# Patient Record
Sex: MALE | Race: BLACK OR AFRICAN AMERICAN | NOT HISPANIC OR LATINO | URBAN - METROPOLITAN AREA
[De-identification: names, ages, dates, MRNs, and addresses within clinical notes are randomized per-mention and may not be internally consistent; named-entity substitution may affect disease eponyms.]

---

## 2022-10-11 ENCOUNTER — EMERGENCY (EMERGENCY)
Facility: HOSPITAL | Age: 27
LOS: 1 days | Discharge: HOME | End: 2022-10-11
Attending: EMERGENCY MEDICINE | Admitting: EMERGENCY MEDICINE

## 2022-10-11 VITALS
OXYGEN SATURATION: 96 % | WEIGHT: 169.98 LBS | DIASTOLIC BLOOD PRESSURE: 80 MMHG | TEMPERATURE: 98 F | RESPIRATION RATE: 20 BRPM | HEIGHT: 68 IN | HEART RATE: 113 BPM | SYSTOLIC BLOOD PRESSURE: 128 MMHG

## 2022-10-11 VITALS
OXYGEN SATURATION: 98 % | DIASTOLIC BLOOD PRESSURE: 72 MMHG | TEMPERATURE: 98 F | RESPIRATION RATE: 18 BRPM | SYSTOLIC BLOOD PRESSURE: 119 MMHG | HEART RATE: 75 BPM

## 2022-10-11 DIAGNOSIS — S43.004A UNSPECIFIED DISLOCATION OF RIGHT SHOULDER JOINT, INITIAL ENCOUNTER: ICD-10-CM
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
## 2022-10-11 PROCEDURE — 23655 CLTX SHO DSLC W/MNPJ W/ANES: CPT | Mod: 54,RT

## 2022-10-11 PROCEDURE — 73030 X-RAY EXAM OF SHOULDER: CPT | Mod: 26,RT

## 2022-10-11 PROCEDURE — 99284 EMERGENCY DEPT VISIT MOD MDM: CPT | Mod: 25,57

## 2022-10-11 PROCEDURE — 73030 X-RAY EXAM OF SHOULDER: CPT | Mod: 26,RT,76

## 2022-10-11 PROCEDURE — 99152 MOD SED SAME PHYS/QHP 5/>YRS: CPT

## 2022-10-11 RX ORDER — PROPOFOL 10 MG/ML
6 INJECTION, EMULSION INTRAVENOUS ONCE
Refills: 0 | Status: COMPLETED | OUTPATIENT
Start: 2022-10-11 | End: 2022-10-11

## 2022-10-11 RX ORDER — MORPHINE SULFATE 50 MG/1
6 CAPSULE, EXTENDED RELEASE ORAL ONCE
Refills: 0 | Status: DISCONTINUED | OUTPATIENT
Start: 2022-10-11 | End: 2022-10-11

## 2022-10-11 RX ORDER — PROPOFOL 10 MG/ML
3 INJECTION, EMULSION INTRAVENOUS ONCE
Refills: 0 | Status: COMPLETED | OUTPATIENT
Start: 2022-10-11 | End: 2022-10-11

## 2022-10-11 RX ORDER — PROPOFOL 10 MG/ML
10 INJECTION, EMULSION INTRAVENOUS ONCE
Refills: 0 | Status: COMPLETED | OUTPATIENT
Start: 2022-10-11 | End: 2022-10-11

## 2022-10-11 RX ADMIN — PROPOFOL 6 MILLIGRAM(S): 10 INJECTION, EMULSION INTRAVENOUS at 20:43

## 2022-10-11 RX ADMIN — MORPHINE SULFATE 6 MILLIGRAM(S): 50 CAPSULE, EXTENDED RELEASE ORAL at 22:14

## 2022-10-11 RX ADMIN — PROPOFOL 3 MILLIGRAM(S): 10 INJECTION, EMULSION INTRAVENOUS at 20:37

## 2022-10-11 RX ADMIN — MORPHINE SULFATE 6 MILLIGRAM(S): 50 CAPSULE, EXTENDED RELEASE ORAL at 19:08

## 2022-10-11 RX ADMIN — PROPOFOL 10 MILLIGRAM(S): 10 INJECTION, EMULSION INTRAVENOUS at 20:48

## 2022-10-11 RX ADMIN — PROPOFOL 6 MILLIGRAM(S): 10 INJECTION, EMULSION INTRAVENOUS at 20:34

## 2022-10-11 RX ADMIN — PROPOFOL 6 MILLIGRAM(S): 10 INJECTION, EMULSION INTRAVENOUS at 20:39

## 2022-10-11 NOTE — ED PROVIDER NOTE - PROGRESS NOTE DETAILS
Patient is resting comfortably, NAD. Awake, alert, oriented x3, gait steady, speech clear. Signed out to Dr. Perez awaiting repeat XR. If shoulder reduced, will d/c. Patient is resting comfortably, NAD. Awake, alert, oriented x3, gait steady, speech clear. Shoulder reduced. f/u with ortho in 1 week. instructed to wear shoulder immobilizer at all times until cleared by ortho.

## 2022-10-11 NOTE — ED ADULT NURSE REASSESSMENT NOTE - NS ED NURSE REASSESS COMMENT FT1
Received pt from previous RN. Pt placed in room for conscious sedation. Placed on ccm, pulse ox, and ETCO2 monitoring. VSS. Pt verbalizes understanding of place and need for R shoulder reduction. Pt is AOx4.

## 2022-10-11 NOTE — ED ADULT TRIAGE NOTE - NS ED TRIAGE AVPU SCALE
Alert-The patient is alert, awake and responds to voice. The patient is oriented to time, place, and person. The triage nurse is able to obtain subjective information.
R LE/weight-bearing as tolerated

## 2022-10-11 NOTE — ED PROVIDER NOTE - NSFOLLOWUPINSTRUCTIONS_ED_ALL_ED_FT
Closed Reduction    WHAT YOU NEED TO KNOW:    Closed reduction is a procedure to put the pieces of a broken bone back into the right place without surgery. Closed reduction is used when your bone is broken in one place and the bone pieces have not gone through the skin. It is also used when you do not need hardware such as pins, screws, or plates to hold the pieces of bone in place. It is best if closed reduction can be done as soon as possible after your bone is broken.    DISCHARGE INSTRUCTIONS:    Call your local emergency number (911 in the ) if:   •You feel lightheaded, short of breath, and have chest pain.      •You cough up blood.      Call your doctor if:   •Your arm or leg feels warm, tender, and painful. It may look swollen and red.      •You have severe pain, even after you take medicine.      •Your injured limb is red or swollen.      •You have numbness in your fingers or toes.      •You have a fever or chills.      •There is a foul smell coming from inside your splint or cast.      •Your splint or cast gets damaged, wet, or cracks.      •Your splint or cast feels too tight or too loose.      •You have questions or concerns about your condition or care.      Medicines: You may need any of the following:   •Acetaminophen decreases pain and fever. It is available without a doctor's order. Ask how much to take and how often to take it. Follow directions. Read the labels of all other medicines you are using to see if they also contain acetaminophen, or ask your doctor or pharmacist. Acetaminophen can cause liver damage if not taken correctly.      •NSAIDs, such as ibuprofen, help decrease swelling, pain, and fever. This medicine is available with or without a doctor's order. NSAIDs can cause stomach bleeding or kidney problems in certain people. If you take blood thinner medicine, always ask if NSAIDs are safe for you. Always read the medicine label and follow directions. Do not give these medicines to children younger than 6 months without direction from a healthcare provider.      •Prescription pain medicine may be given. Ask your healthcare provider how to take this medicine safely. Some prescription pain medicines contain acetaminophen. Do not take other medicines that contain acetaminophen without talking to your healthcare provider. Too much acetaminophen may cause liver damage. Prescription pain medicine may cause constipation. Ask your healthcare provider how to prevent or treat constipation.       •Take your medicine as directed. Contact your healthcare provider if you think your medicine is not helping or if you have side effects. Tell your provider if you are allergic to any medicine. Keep a list of the medicines, vitamins, and herbs you take. Include the amounts, and when and why you take them. Bring the list or the pill bottles to follow-up visits. Carry your medicine list with you in case of an emergency.      Care for yourself at home:   •Use crutches or a walker as directed if you broke a bone in your leg or foot. These devices will help you walk and take some weight off your injured leg or foot.      •Rest your limb as much as possible. Ask your healthcare provider when you can return to daily activities. You will need to limit activities or exercise while your bone heals.      •Elevate your injured area above the level of your heart as often as you can. This will help decrease swelling and pain. Prop your cast or splint on pillows or blankets to keep it elevated comfortably.       •Apply ice on your broken bone for 15 to 20 minutes every hour or as directed. Use an ice pack, or put crushed ice in a plastic bag. Cover it with a towel. Ice helps prevent tissue damage and decreases swelling and pain.      •Do not take baths, soak in a hot tub, or go swimming until your provider says it is okay.      •Go to physical therapy if directed. Physical therapy usually starts after your bones have healed and your splint or cast is removed. A physical therapist teaches you exercises to help improve movement and strength.      •Do not wear rings on your fingers if the break was in an arm or hand. Don't wear rings on your toes if the break was in the leg or foot.      Cast and splint care:   •Check your cast every day. Call your healthcare provider if you notice any cracks, dents, holes, or flaking on your cast.       •Keep your splint or cast clean and dry. Cover your splint or cast with a towel when you eat. If your splint or cast gets dirty, use a mild detergent and a damp washcloth to wipe off the outside. Continue to cover your splint or cast with trash bags to keep it dry while you bathe.       •Care for the edges of your cast. Cover the cast edges to keep them smooth. Use 4 inch pieces of waterproof tape. Place one end of the tape under the inside edge of your cast and fold it over to the outside surface. Overlap tape strips until the edges are completely covered. Change the tape as directed. Do not pull or repair any of the padding from inside the cast. This could cause blisters and sores on the skin under your cast.       •Keep weight off your splint or cast. Do not let anyone push down or lean on your splint or cast. This may cause it to break.      •Do not put sharp objects in the splint or cast. Do not use a sharp or pointed object to scratch under your splint or cast. This may cause wounds that can get infected, or you may lose the item inside. If your skin itches, blow cool air into the splint or cast. You may also gently scratch your skin outside with a cloth.       Follow up with your healthcare provider as directed: You may need to return for x-rays or to have your splint changed to a cast. Write down your questions so you remember to ask them during your visits.        © Copyright [employer] 2022           back to top                          © Copyright [employer] 2022

## 2022-10-11 NOTE — ED PROVIDER NOTE - PATIENT PORTAL LINK FT
You can access the FollowMyHealth Patient Portal offered by  by registering at the following website: [email or web address]. By joining [employer]’s FollowMyHealth portal, you will also be able to view your health information using other applications (apps) compatible with our system.

## 2022-10-11 NOTE — ED ADULT TRIAGE NOTE - CHIEF COMPLAINT QUOTE
Pt BIBA with c/o Rt shoulder pain while playing basketball + deformity. Prior hx shoulder dislocation.

## 2022-10-11 NOTE — ED ADULT NURSE NOTE - OBJECTIVE STATEMENT
Patient reports right shoulder dislocation during a game of basketball. Patient reports previous shoulder dislocation. Patient reports pain and numbness/ tingling to RUE. Patient has FROM of elbow, wrist and digits.

## 2022-10-11 NOTE — ED PROVIDER NOTE - PHYSICAL EXAMINATION
Gen: Well-developed, well-nourished, NAD, VS as noted by nursing. HEENT: NCAT, mmm   Chest: RRR, nl S1 and S2, no m/r/g. Resp: CTAB, no w/r/r  Abd: nl BS, soft, nt/nd. Ext: Warm, dry. right shoulder deformity, decreased ROM. hypesthesia over right deltoid  Neuro: CN II-XII intact, normal and equal strength, sensation, and reflexes bilaterally, normal gait  Psych: AAOx3

## 2022-10-11 NOTE — ED PROVIDER NOTE - OBJECTIVE STATEMENT
Patient was playing basketball, hit ball with right hand, felt "pop" in right shoulder. Has dislocated that shoulder 5 times. Never followed up with orthopedics. No focal weakness, paresthesias.

## 2022-10-12 NOTE — ED PROCEDURE NOTE - NS_POSTPROCCAREGUIDE_ED_ALL_ED
Patient is now fully awake, with vital signs and temperature stable, hydration is adequate, patients Danna’s  score is at baseline (or greater than 8), patient and escort has received  discharge education.

## 2023-04-04 ENCOUNTER — EMERGENCY (EMERGENCY)
Facility: HOSPITAL | Age: 28
LOS: 1 days | Discharge: ANOTHER HOSPITAL | End: 2023-04-04
Attending: EMERGENCY MEDICINE | Admitting: EMERGENCY MEDICINE
Payer: MEDICAID

## 2023-04-04 VITALS
DIASTOLIC BLOOD PRESSURE: 83 MMHG | HEART RATE: 81 BPM | RESPIRATION RATE: 978 BRPM | SYSTOLIC BLOOD PRESSURE: 128 MMHG | OXYGEN SATURATION: 98 % | WEIGHT: 198.42 LBS | TEMPERATURE: 98 F
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
## 2023-04-04 DIAGNOSIS — M24.411 RECURRENT DISLOCATION, RIGHT SHOULDER: ICD-10-CM

## 2023-04-04 DIAGNOSIS — Z20.822 CONTACT WITH AND (SUSPECTED) EXPOSURE TO COVID-19: ICD-10-CM

## 2023-04-04 DIAGNOSIS — M25.511 PAIN IN RIGHT SHOULDER: ICD-10-CM
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
## 2023-04-04 LAB
ALBUMIN SERPL ELPH-MCNC: 4.5 G/DL — SIGNIFICANT CHANGE UP (ref 3.4–5)
ALP SERPL-CCNC: 71 U/L — SIGNIFICANT CHANGE UP (ref 40–120)
ALT FLD-CCNC: 27 U/L — SIGNIFICANT CHANGE UP (ref 12–42)
ANION GAP SERPL CALC-SCNC: 5 MMOL/L — LOW (ref 9–16)
APTT BLD: 28.3 SEC — SIGNIFICANT CHANGE UP (ref 27.5–35.5)
AST SERPL-CCNC: 21 U/L — SIGNIFICANT CHANGE UP (ref 15–37)
BILIRUB SERPL-MCNC: 0.2 MG/DL — SIGNIFICANT CHANGE UP (ref 0.2–1.2)
BUN SERPL-MCNC: 17 MG/DL — SIGNIFICANT CHANGE UP (ref 7–23)
CALCIUM SERPL-MCNC: 8.9 MG/DL — SIGNIFICANT CHANGE UP (ref 8.5–10.5)
CHLORIDE SERPL-SCNC: 107 MMOL/L — SIGNIFICANT CHANGE UP (ref 96–108)
CO2 SERPL-SCNC: 30 MMOL/L — SIGNIFICANT CHANGE UP (ref 22–31)
CREAT SERPL-MCNC: 1.7 MG/DL — HIGH (ref 0.5–1.3)
EGFR: 56 ML/MIN/1.73M2 — LOW
GLUCOSE SERPL-MCNC: 92 MG/DL — SIGNIFICANT CHANGE UP (ref 70–99)
HCT VFR BLD CALC: 44.1 % — SIGNIFICANT CHANGE UP (ref 39–50)
HGB BLD-MCNC: 14.6 G/DL — SIGNIFICANT CHANGE UP (ref 13–17)
HIV 1 & 2 AB SERPL IA.RAPID: SIGNIFICANT CHANGE UP
INR BLD: 1.11 — SIGNIFICANT CHANGE UP (ref 0.88–1.16)
MCHC RBC-ENTMCNC: 28.3 PG — SIGNIFICANT CHANGE UP (ref 27–34)
MCHC RBC-ENTMCNC: 33.1 GM/DL — SIGNIFICANT CHANGE UP (ref 32–36)
MCV RBC AUTO: 85.6 FL — SIGNIFICANT CHANGE UP (ref 80–100)
NRBC # BLD: 0 /100 WBCS — SIGNIFICANT CHANGE UP (ref 0–0)
PLATELET # BLD AUTO: 207 K/UL — SIGNIFICANT CHANGE UP (ref 150–400)
POTASSIUM SERPL-MCNC: 4 MMOL/L — SIGNIFICANT CHANGE UP (ref 3.5–5.3)
POTASSIUM SERPL-SCNC: 4 MMOL/L — SIGNIFICANT CHANGE UP (ref 3.5–5.3)
PROT SERPL-MCNC: 8 G/DL — SIGNIFICANT CHANGE UP (ref 6.4–8.2)
PROTHROM AB SERPL-ACNC: 13 SEC — SIGNIFICANT CHANGE UP (ref 10.5–13.4)
RBC # BLD: 5.15 M/UL — SIGNIFICANT CHANGE UP (ref 4.2–5.8)
RBC # FLD: 12.4 % — SIGNIFICANT CHANGE UP (ref 10.3–14.5)
SARS-COV-2 RNA SPEC QL NAA+PROBE: SIGNIFICANT CHANGE UP
SODIUM SERPL-SCNC: 142 MMOL/L — SIGNIFICANT CHANGE UP (ref 132–145)
WBC # BLD: 6.71 K/UL — SIGNIFICANT CHANGE UP (ref 3.8–10.5)
WBC # FLD AUTO: 6.71 K/UL — SIGNIFICANT CHANGE UP (ref 3.8–10.5)

## 2023-04-04 PROCEDURE — 99285 EMERGENCY DEPT VISIT HI MDM: CPT

## 2023-04-04 PROCEDURE — 73030 X-RAY EXAM OF SHOULDER: CPT | Mod: 26,RT

## 2023-04-04 RX ORDER — KETOROLAC TROMETHAMINE 30 MG/ML
15 SYRINGE (ML) INJECTION ONCE
Refills: 0 | Status: DISCONTINUED | OUTPATIENT
Start: 2023-04-04 | End: 2023-04-04

## 2023-04-04 RX ORDER — MORPHINE SULFATE 50 MG/1
2 CAPSULE, EXTENDED RELEASE ORAL ONCE
Refills: 0 | Status: DISCONTINUED | OUTPATIENT
Start: 2023-04-04 | End: 2023-04-04

## 2023-04-04 RX ORDER — MIDAZOLAM HYDROCHLORIDE 1 MG/ML
4 INJECTION, SOLUTION INTRAMUSCULAR; INTRAVENOUS ONCE
Refills: 0 | Status: DISCONTINUED | OUTPATIENT
Start: 2023-04-04 | End: 2023-04-04

## 2023-04-04 RX ORDER — FENTANYL CITRATE 50 UG/ML
75 INJECTION INTRAVENOUS ONCE
Refills: 0 | Status: DISCONTINUED | OUTPATIENT
Start: 2023-04-04 | End: 2023-04-04

## 2023-04-04 RX ORDER — OXYCODONE AND ACETAMINOPHEN 5; 325 MG/1; MG/1
1 TABLET ORAL ONCE
Refills: 0 | Status: DISCONTINUED | OUTPATIENT
Start: 2023-04-04 | End: 2023-04-04

## 2023-04-04 RX ORDER — SODIUM CHLORIDE 9 MG/ML
1000 INJECTION INTRAMUSCULAR; INTRAVENOUS; SUBCUTANEOUS ONCE
Refills: 0 | Status: COMPLETED | OUTPATIENT
Start: 2023-04-04 | End: 2023-04-04

## 2023-04-04 RX ORDER — KETOROLAC TROMETHAMINE 30 MG/ML
30 SYRINGE (ML) INJECTION ONCE
Refills: 0 | Status: DISCONTINUED | OUTPATIENT
Start: 2023-04-04 | End: 2023-04-04

## 2023-04-04 RX ADMIN — MORPHINE SULFATE 2 MILLIGRAM(S): 50 CAPSULE, EXTENDED RELEASE ORAL at 22:21

## 2023-04-04 RX ADMIN — Medication 15 MILLIGRAM(S): at 19:25

## 2023-04-04 RX ADMIN — MORPHINE SULFATE 2 MILLIGRAM(S): 50 CAPSULE, EXTENDED RELEASE ORAL at 19:25

## 2023-04-04 RX ADMIN — MIDAZOLAM HYDROCHLORIDE 4 MILLIGRAM(S): 1 INJECTION, SOLUTION INTRAMUSCULAR; INTRAVENOUS at 20:23

## 2023-04-04 RX ADMIN — MORPHINE SULFATE 2 MILLIGRAM(S): 50 CAPSULE, EXTENDED RELEASE ORAL at 19:53

## 2023-04-04 RX ADMIN — SODIUM CHLORIDE 1000 MILLILITER(S): 9 INJECTION INTRAMUSCULAR; INTRAVENOUS; SUBCUTANEOUS at 20:23

## 2023-04-04 RX ADMIN — Medication 30 MILLIGRAM(S): at 18:55

## 2023-04-04 RX ADMIN — FENTANYL CITRATE 75 MICROGRAM(S): 50 INJECTION INTRAVENOUS at 20:23

## 2023-04-04 NOTE — ED ADULT NURSE NOTE - NSIMPLEMENTINTERV_GEN_ALL_ED
Implemented All Universal Safety Interventions:  [place] to call system. Call bell, personal items and telephone within reach. Instruct patient to call for assistance. Room bathroom lighting operational. Non-slip footwear when patient is off stretcher. Physically safe environment: no spills, clutter or unnecessary equipment. Stretcher in lowest position, wheels locked, appropriate side rails in place.

## 2023-04-04 NOTE — ED PROVIDER NOTE - CLINICAL SUMMARY MEDICAL DECISION MAKING FREE TEXT BOX
Shoulder pain right, was playing basketball at [employer] and states he raised his arms (no fall or direct blow) and felt pain.  States he has dislocated this shoulder approximately 10 times in the past few years but has not followed up with ortho.      Attempted reduction under sedation.  Reduction unsuccessful.  Called ortho through [facility], Dr Katharine hunt, recommends ED transfer, Dr Morales hunt.

## 2023-04-04 NOTE — ED PROVIDER NOTE - OBJECTIVE STATEMENT
Shoulder pain right, was playing basketball at [employer] and states he raised his arms (no fall or direct blow) and felt pain.  States he has dislocated this shoulder approximately 10 times in the past few years but has not followed up with ortho.

## 2023-04-04 NOTE — ED ADULT NURSE REASSESSMENT NOTE - NS ED NURSE REASSESS COMMENT FT1
Attempted to perform Rt shoulder reduction as per facility protocol. Please see conscious sedation flowsheet. Procedure unsuccessful and pt to be transferred to [facility] ED for further care and management. Pt is currently awake, alert and oriented x 4. Heplock 20G left ac is patent and intact. Pt is talking to his friend on the phone, in no apparent distress. Right arm remains in sling. Report given to GABY Lopes at receiving facility. Awaiting transportation services. Safety and comfort measures in place and maintained.
Pt reporting return of pain. Medicated with morphine as per md order and well tolerated. vs obtained and documented, see flowsheet.

## 2023-04-04 NOTE — ED ADULT NURSE NOTE - OBJECTIVE STATEMENT
TENDERNESS/atraumatic pt c/o R shoulder pain and deformity after playing basketball, pt has hx of prior shoulder dislocations in the affected arm.

## 2023-04-05 ENCOUNTER — EMERGENCY (EMERGENCY)
Facility: HOSPITAL | Age: 28
LOS: 1 days | Discharge: HOME | End: 2023-04-05
Attending: STUDENT IN AN ORGANIZED HEALTH CARE EDUCATION/TRAINING PROGRAM | Admitting: STUDENT IN AN ORGANIZED HEALTH CARE EDUCATION/TRAINING PROGRAM
Payer: COMMERCIAL

## 2023-04-05 VITALS
TEMPERATURE: 98 F | OXYGEN SATURATION: 96 % | RESPIRATION RATE: 18 BRPM | SYSTOLIC BLOOD PRESSURE: 136 MMHG | DIASTOLIC BLOOD PRESSURE: 94 MMHG | HEART RATE: 69 BPM

## 2023-04-05 VITALS
SYSTOLIC BLOOD PRESSURE: 134 MMHG | OXYGEN SATURATION: 98 % | RESPIRATION RATE: 16 BRPM | DIASTOLIC BLOOD PRESSURE: 77 MMHG | HEART RATE: 74 BPM

## 2023-04-05 VITALS
DIASTOLIC BLOOD PRESSURE: 90 MMHG | HEART RATE: 75 BPM | OXYGEN SATURATION: 98 % | RESPIRATION RATE: 20 BRPM | SYSTOLIC BLOOD PRESSURE: 152 MMHG
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
## 2023-04-05 DIAGNOSIS — S43.014A ANTERIOR DISLOCATION OF RIGHT HUMERUS, INITIAL ENCOUNTER: ICD-10-CM

## 2023-04-05 DIAGNOSIS — Z87.828 PERSONAL HISTORY OF OTHER (HEALED) PHYSICAL INJURY AND TRAUMA: ICD-10-CM
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
## 2023-04-05 PROCEDURE — 99152 MOD SED SAME PHYS/QHP 5/>YRS: CPT

## 2023-04-05 PROCEDURE — 99285 EMERGENCY DEPT VISIT HI MDM: CPT | Mod: 25

## 2023-04-05 PROCEDURE — 99284 EMERGENCY DEPT VISIT MOD MDM: CPT | Mod: 25

## 2023-04-05 PROCEDURE — 73030 X-RAY EXAM OF SHOULDER: CPT | Mod: 26,RT

## 2023-04-05 PROCEDURE — 96374 THER/PROPH/DIAG INJ IV PUSH: CPT | Mod: XU

## 2023-04-05 PROCEDURE — 99156 MOD SED OTH PHYS/QHP 5/>YRS: CPT

## 2023-04-05 PROCEDURE — 96375 TX/PRO/DX INJ NEW DRUG ADDON: CPT | Mod: XU

## 2023-04-05 PROCEDURE — 73020 X-RAY EXAM OF SHOULDER: CPT

## 2023-04-05 PROCEDURE — 73020 X-RAY EXAM OF SHOULDER: CPT | Mod: 26,59,RT

## 2023-04-05 PROCEDURE — 73030 X-RAY EXAM OF SHOULDER: CPT

## 2023-04-05 PROCEDURE — 23650 CLTX SHO DSLC W/MNPJ WO ANES: CPT | Mod: RT

## 2023-04-05 RX ORDER — KETAMINE HYDROCHLORIDE 100 MG/ML
50 INJECTION INTRAMUSCULAR; INTRAVENOUS ONCE
Refills: 0 | Status: DISCONTINUED | OUTPATIENT
Start: 2023-04-05 | End: 2023-04-05

## 2023-04-05 RX ORDER — HYDROMORPHONE HYDROCHLORIDE 2 MG/ML
0.5 INJECTION INTRAMUSCULAR; INTRAVENOUS; SUBCUTANEOUS ONCE
Refills: 0 | Status: DISCONTINUED | OUTPATIENT
Start: 2023-04-05 | End: 2023-04-05

## 2023-04-05 RX ORDER — PROPOFOL 10 MG/ML
50 INJECTION, EMULSION INTRAVENOUS ONCE
Refills: 0 | Status: COMPLETED | OUTPATIENT
Start: 2023-04-05 | End: 2023-04-05

## 2023-04-05 RX ORDER — DIAZEPAM 5 MG
5 TABLET ORAL ONCE
Refills: 0 | Status: DISCONTINUED | OUTPATIENT
Start: 2023-04-05 | End: 2023-04-05

## 2023-04-05 RX ADMIN — PROPOFOL 50 MILLIGRAM(S): 10 INJECTION, EMULSION INTRAVENOUS at 03:48

## 2023-04-05 RX ADMIN — KETAMINE HYDROCHLORIDE 50 MILLIGRAM(S): 100 INJECTION INTRAMUSCULAR; INTRAVENOUS at 03:48

## 2023-04-05 RX ADMIN — Medication 5 MILLIGRAM(S): at 01:33

## 2023-04-05 RX ADMIN — HYDROMORPHONE HYDROCHLORIDE 0.5 MILLIGRAM(S): 2 INJECTION INTRAMUSCULAR; INTRAVENOUS; SUBCUTANEOUS at 01:33

## 2023-04-05 NOTE — ED PROVIDER NOTE - CLINICAL SUMMARY MEDICAL DECISION MAKING FREE TEXT BOX
26 yo male with a hx of multiple R shoulder dislocations in the past sent to the ED from [facility] for dislocation s/p unsuccessful reduction in the ED. Reduction done by ortho at bedside under moderate sedation using ketofol. Pending repeat XR, sobriety from sedation and ortho recs. Signed out to Dr. Willoughby.

## 2023-04-05 NOTE — CONSULT NOTE ADULT - SUBJECTIVE AND OBJECTIVE BOX
HPI  27yMale R-hand dominant w/ R shoulder pain while blocking a shot playing basketball. Denies headstrike or LOC. Denies numbness/tingling in the LUE. Hx of multiple R shoulder dislocations in the past requiring conscious sedation each time for reduction. Has not followed up w orthopedist for management of recurrent dislocation. He initially presented to [facility] where they attempted to CR w conscious sedation but were unable to get a reduction so pt was transferred to [facility] ED for further mgt   ROS  Negative unless otherwise specified in HPI.    PAST MEDICAL & SURGICAL Hx  PAST MEDICAL & SURGICAL HISTORY:  No pertinent past medical history          MEDICATIONS  Home Medications:      ALLERGIES  No Known Allergies      FAMILY Hx  FAMILY HISTORY:      SOCIAL Hx  Social History:      VITALS  Vital Signs Last 24 Hrs  T(C): 36.4 (05 Apr 2023 00:40), Max: 36.6 (04 Apr 2023 17:57)  T(F): 97.5 (05 Apr 2023 00:40), Max: 97.8 (04 Apr 2023 17:57)  HR: 74 (05 Apr 2023 02:45) (63 - 103)  BP: 134/77 (05 Apr 2023 02:45) (128/83 - 166/98)  BP(mean): --  RR: 16 (05 Apr 2023 02:45) (15 - 978)  SpO2: 98% (05 Apr 2023 02:45) (96% - 100%)    Parameters below as of 05 Apr 2023 02:45  Patient On (Oxygen Delivery Method): room air        PHYSICAL EXAM  Gen: Lying in bed, NAD  Resp: No increased WOB  RUE:  Skin intact, abducted and externally rotated, +visible shoulder deformity, no ecchymosis or edema  +Palpable deformity and TTP over shoulder, no TTP along remainder of extremity; compartments soft  Limited ROM at shoulder 2/2 pain  Motor: Ax/Musc/Med/Rad/AIN/PIN/U intact  Sensory: pre-reduction pt endorses diffusely dec sensation of palmar and dorsal asp of L hand; however post reduction, pt endorses full sensation in m/u/r distribution of R hand   +Rad pulse, WWP    Secondary survey:  No TTP along spine or other extremities, pelvis grossly stable, SILT and soft compartments throughout    LABS                        14.6   6.71  )-----------( 207      ( 04 Apr 2023 20:40 )             44.1     04-04    142  |  107  |  17  ----------------------------<  92  4.0   |  30  |  1.70<H>    Ca    8.9      04 Apr 2023 20:40    TPro  8.0  /  Alb  4.5  /  TBili  0.2  /  DBili  x   /  AST  21  /  ALT  27  /  AlkPhos  71  04-04    PT/INR - ( 04 Apr 2023 20:40 )   PT: 13.0 sec;   INR: 1.11          PTT - ( 04 Apr 2023 20:40 )  PTT:28.3 sec    IMAGING  XRs: R anterior shoulder dislocation (personal read)    PROCEDURE  Conscious sedation was first performed by the ED. Closed reduction was subsequently performed and a sling & swathe applied. The patient tolerated the procedure well without evidence of complications. The patient was neurovascularly intact following reduction. Post-reduction XRs demonstrated successful relocation of the joint.    ASSESSMENT & PLAN  27yMale w/ R anterior shoulder dislocation s/p closed reduction and immobilization.  -NWB RUE in a sling AT ALL TIMES   -pain control  -ice/cold compress  -no acute ortho surgery at this time  -f/u outpt with Dr. Russ Alcantar in 1 week, call office for appt. If he does not accept your insurance, please call the Orthopedics [facility] at [phone] to make an appointment instead

## 2023-04-05 NOTE — ED ADULT NURSE NOTE - OBJECTIVE STATEMENT
Pt presents to ER c/o, Pt A&O x3, calm and cooperative, airway patent, respirations regular, non-labored, lungs clear bilaterally to auscultation, abdomen soft non-tender, normoactive bowel sounds noted in all quadrants, strong palpable peripheral pulses noted, skin warm dry intact. Pt waiting ER provider evaluation. Pt presents to ER c/o right shoulder pain, was playing basketball at [employer] and states he raised his arms (no fall or direct blow) and felt pain.  States he has dislocated this shoulder approximately 10 times in the past few years but has not followed up with ortho., Pt A&O x3, calm and cooperative, airway patent, respirations regular, non-labored, lungs clear bilaterally to auscultation, abdomen soft non-tender, normoactive bowel sounds noted in all quadrants, strong palpable peripheral pulses noted, skin warm dry intact. Pt waiting ER provider evaluation.

## 2023-04-05 NOTE — ED PROVIDER NOTE - OBJECTIVE STATEMENT
28 yo male with a hx of multiple R shoulder dislocations in the past sent to the ED from [facility] for dislocation s/p unsuccessful reduction in the ED. Dislocated while playing basketball. Denies other injuries.

## 2023-04-05 NOTE — ED PROVIDER NOTE - CARE PROVIDER_API CALL
Russ Alcantar)  Orthopaedic Surgery; Sports Medicine  [address] [place] [address]  Phone: [phone]  Fax: [phone]  Follow Up Time:

## 2023-04-05 NOTE — ED ADULT TRIAGE NOTE - ARRIVAL INFO ADDITIONAL COMMENTS
pt sent from [facility] with a right dislocated shoulder they were unable to reduce.   arm sling intact.  good distal CMST

## 2023-04-05 NOTE — ED PROVIDER NOTE - PATIENT PORTAL LINK FT
You can access the FollowMyHealth Patient Portal offered by [facility] by registering at the following website: [email or web address]. By joining [employer]’s FollowMyHealth portal, you will also be able to view your health information using other applications (apps) compatible with our system.

## 2023-04-05 NOTE — ED PROVIDER NOTE - PHYSICAL EXAMINATION
VITAL SIGNS: I have reviewed nursing notes and confirm.  CONSTITUTIONAL: Well appearing, in no acute distress.   SKIN:  warm and dry, no acute rash.   HEAD:  normocephalic, atraumatic.  EYES: EOM intact; conjunctiva and sclera clear.  ENT: No nasal discharge; airway clear.   NECK: Supple; non tender.  CARD: S1, S2 normal; no murmurs, gallops, or rubs. Regular rate and rhythm.   RESP:  Clear to auscultation b/l, no wheezes, rales or rhonchi.  ABD: Normal bowel sounds; soft; non-distended; non-tender; no guarding/ rebound.  EXT: Normal ROM. No clubbing, cyanosis or edema. 2+ pulses to b/l ue/le. +R shoulder dislocation  NEURO: Alert, oriented, grossly unremarkable  PSYCH: Cooperative, mood and affect appropriate.

## 2024-09-14 NOTE — ED ADULT NURSE NOTE - NSFALLRSKASSESSDT_ED_ALL_ED
04-Apr-2023 18:58 Neuro intact, due to being out of her pain medication, pain but after meds here, safe for discharge and outpatient pain management follow-up for which she has appointment

## 2025-05-06 NOTE — ED PROCEDURE NOTE - NS ED PERI VASCULAR NEG
fingers/toes warm to touch/no swelling/no cyanosis of extremity/capillary refill time < 2 seconds
[id number]